# Patient Record
Sex: MALE | Race: WHITE
[De-identification: names, ages, dates, MRNs, and addresses within clinical notes are randomized per-mention and may not be internally consistent; named-entity substitution may affect disease eponyms.]

---

## 2019-11-30 ENCOUNTER — HOSPITAL ENCOUNTER (EMERGENCY)
Dept: HOSPITAL 97 - ER | Age: 49
LOS: 1 days | Discharge: HOME | End: 2019-12-01
Payer: COMMERCIAL
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
## 2019-11-30 DIAGNOSIS — S01.81XD: Primary | ICD-10-CM

## 2019-11-30 DIAGNOSIS — S61.411A: ICD-10-CM
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
## 2019-11-30 DIAGNOSIS — M25.512: ICD-10-CM

## 2019-11-30 DIAGNOSIS — S46.012A: ICD-10-CM

## 2019-11-30 DIAGNOSIS — J01.90: ICD-10-CM

## 2019-11-30 LAB
ALBUMIN SERPL BCP-MCNC: 4.2 G/DL (ref 3.4–5)
ALP SERPL-CCNC: 61 U/L (ref 45–117)
ALT SERPL W P-5'-P-CCNC: 51 U/L (ref 12–78)
AST SERPL W P-5'-P-CCNC: 35 U/L (ref 15–37)
BUN BLD-MCNC: 14 MG/DL (ref 7–18)
GLUCOSE SERPLBLD-MCNC: 107 MG/DL (ref 74–106)
HCT VFR BLD CALC: 43.9 % (ref 39.6–49)
LIPASE SERPL-CCNC: 114 U/L (ref 73–393)
LYMPHOCYTES # SPEC AUTO: 1.5 K/UL (ref 0.7–4.9)
PMV BLD: 9.1 FL (ref 7.6–11.3)
POTASSIUM SERPL-SCNC: 3.6 MMOL/L (ref 3.5–5.1)
RBC # BLD: 4.86 M/UL (ref 4.33–5.43)

## 2019-11-30 PROCEDURE — 86850 RBC ANTIBODY SCREEN: CPT

## 2019-11-30 PROCEDURE — 72125 CT NECK SPINE W/O DYE: CPT

## 2019-11-30 PROCEDURE — 73030 X-RAY EXAM OF SHOULDER: CPT

## 2019-11-30 PROCEDURE — 36415 COLL VENOUS BLD VENIPUNCTURE: CPT

## 2019-11-30 PROCEDURE — 86901 BLOOD TYPING SEROLOGIC RH(D): CPT

## 2019-11-30 PROCEDURE — 81003 URINALYSIS AUTO W/O SCOPE: CPT

## 2019-11-30 PROCEDURE — 96361 HYDRATE IV INFUSION ADD-ON: CPT

## 2019-11-30 PROCEDURE — 12011 RPR F/E/E/N/L/M 2.5 CM/<: CPT

## 2019-11-30 PROCEDURE — 71260 CT THORAX DX C+: CPT

## 2019-11-30 PROCEDURE — 96365 THER/PROPH/DIAG IV INF INIT: CPT

## 2019-11-30 PROCEDURE — 83690 ASSAY OF LIPASE: CPT

## 2019-11-30 PROCEDURE — 85025 COMPLETE CBC W/AUTO DIFF WBC: CPT

## 2019-11-30 PROCEDURE — 73130 X-RAY EXAM OF HAND: CPT

## 2019-11-30 PROCEDURE — 80048 BASIC METABOLIC PNL TOTAL CA: CPT

## 2019-11-30 PROCEDURE — 0JQ10ZZ REPAIR FACE SUBCUTANEOUS TISSUE AND FASCIA, OPEN APPROACH: ICD-10-PCS

## 2019-11-30 PROCEDURE — 86900 BLOOD TYPING SEROLOGIC ABO: CPT

## 2019-11-30 PROCEDURE — 99285 EMERGENCY DEPT VISIT HI MDM: CPT

## 2019-11-30 PROCEDURE — 80076 HEPATIC FUNCTION PANEL: CPT

## 2019-11-30 PROCEDURE — 70450 CT HEAD/BRAIN W/O DYE: CPT

## 2019-11-30 PROCEDURE — 74177 CT ABD & PELVIS W/CONTRAST: CPT

## 2019-11-30 PROCEDURE — 73590 X-RAY EXAM OF LOWER LEG: CPT

## 2019-12-01 VITALS — DIASTOLIC BLOOD PRESSURE: 76 MMHG | OXYGEN SATURATION: 98 % | SYSTOLIC BLOOD PRESSURE: 130 MMHG

## 2019-12-01 VITALS — TEMPERATURE: 98.6 F

## 2019-12-01 NOTE — RAD REPORT
EXAM DESCRIPTION:  RAD - Tib Fib Right - 12/1/2019 12:50 am

 

CLINICAL HISTORY:  PAIN

 

COMPARISON:  No comparisons

 

FINDINGS:  No fracture or dislocation seen.
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
## 2019-12-01 NOTE — RAD REPORT
EXAM DESCRIPTION:  RAD - Hand Right 3 View - 12/1/2019 12:50 am

 

CLINICAL HISTORY:  PAIN

 

COMPARISON:  No comparisons

 

FINDINGS:  No fracture or dislocation seen.

## 2019-12-01 NOTE — EDPHYS
Physician Documentation                                                                           

 [facility]                                                                 

Name: Varghese Red                                                                            

Age: 49 yrs                                                                                       

Sex: Male                                                                                         

: 1970                                                                                   

MRN: [id number]                                                                                   

Arrival Date: 2019                                                                          

Time: 22:37                                                                                       

Account#: [id number]                                                                            

Bed 4                                                                                             

Private MD:                                                                                       

ED Physician Prince Carolina                                                                      

HPI:                                                                                              

                                                                                             

22:52 This 49 yrs old  Male presents to ER via Unassigned with complaints of Boat    elroy 

      Accident.                                                                                   

22:52 The patient or guardian reports a laceration, 2.0 cm(s), clean, irregular, pain.        [facility] 

                                                                                                  

Historical:                                                                                       

- Allergies:                                                                                      

22:55 No Known Allergies;                                                                     jd3 

- Home Meds:                                                                                      

22:55 None [Active];                                                                          jd3 

- PMHx:                                                                                           

22:55 None;                                                                                   jd3 

- PSHx:                                                                                           

22:55 face; left hand; left knee;                                                             jd3 

                                                                                                  

- Immunization history:: Adult Immunizations up to date, Last tetanus immunization: up            

  to date < 5 years ago.                                                                          

- Social history:: Smoking status: Patient/guardian denies using tobacco.                         

- Immunization history: Last tetanus immunization: - up to date. < 5 years ago.                   

- Family history:: not pertinent.                                                                 

- Ebola Screening: : Patient negative for fever greater than or equal to 101.5 degrees            

  Fahrenheit, and additional compatible Ebola Virus Disease symptoms.                             

                                                                                                  

                                                                                                  

ROS:                                                                                              

22:52 Constitutional: Negative for fever, chills, and weight loss, Eyes: Negative for injury, elroy 

      pain, redness, and discharge, ENT: Negative for injury, pain, and discharge, Neck:          

      Negative for injury, pain, and swelling, Cardiovascular: Negative for chest pain,           

      palpitations, and edema, Respiratory: Negative for shortness of breath, cough,              

      wheezing, and pleuritic chest pain, Abdomen/GI: Negative for abdominal pain, nausea,        

      vomiting, diarrhea, and constipation, : Negative for injury, bleeding, discharge, and     

      swelling, Neuro: Negative for headache, weakness, numbness, tingling, and seizure,          

      Psych: Negative for depression, anxiety, suicide ideation, homicidal ideation, and          

      hallucinations, Allergy/Immunology: Negative for hives, rash, and allergies, Endocrine:     

      Negative for neck swelling, polydipsia, polyuria, polyphagia, and marked weight             

      changes, Hematologic/Lymphatic: Negative for swollen nodes, abnormal bleeding, and          

      unusual bruising.                                                                           

22:52 Back: Positive for decreased range of motion, pain at rest, pain with movement.             

22:52 MS/extremity: Positive for decreased range of motion, pain, swelling, tenderness, of        

      the right hand.                                                                             

                                                                                                  

Exam:                                                                                             

22:52 Constitutional:  This is a well developed, well nourished patient who is awake, alert,  elroy 

      and in no acute distress. Eyes:  Pupils equal round and reactive to light, extra-ocular     

      motions intact.  Lids and lashes normal.  Conjunctiva and sclera are non-icteric and        

      not injected.  Cornea within normal limits.  Periorbital areas with no swelling,            

      redness, or edema. ENT:  Nares patent. No nasal discharge, no septal abnormalities          

      noted.  Tympanic membranes are normal and external auditory canals are clear.               

      Oropharynx with no redness, swelling, or masses, exudates, or evidence of obstruction,      

      uvula midline.  Mucous membranes moist. Neck:  Trachea midline, no thyromegaly or           

      masses palpated, and no cervical lymphadenopathy.  Supple, full range of motion without     

      nuchal rigidity, or vertebral point tenderness.  No Meningismus. Chest/axilla:  Normal      

      chest wall appearance and motion.  Nontender with no deformity.  No lesions are             

      appreciated. Cardiovascular:  Regular rate and rhythm with a normal S1 and S2.  No          

      gallops, murmurs, or rubs.  Normal PMI, no JVD.  No pulse deficits. Respiratory:  Lungs     

      have equal breath sounds bilaterally, clear to auscultation and percussion.  No rales,      

      rhonchi or wheezes noted.  No increased work of breathing, no retractions or nasal          

      flaring. Abdomen/GI:  Soft, non-tender, with normal bowel sounds.  No distension or         

      tympany.  No guarding or rebound.  No evidence of tenderness throughout. Back:  No          

      spinal tenderness.  No costovertebral tenderness.  Full range of motion. Male :           

      Normal genitalia with no discharge or lesions. Neuro:  Awake and alert, GCS 15,             

      oriented to person, place, time, and situation.  Cranial nerves II-XII grossly intact.      

      Motor strength 5/5 in all extremities.  Sensory grossly intact.  Cerebellar exam            

      normal.  Normal gait. Psych:  Awake, alert, with orientation to person, place and time.     

       Behavior, mood, and affect are within normal limits.                                       

22:52 Head/face: Noted is a laceration(s), that is deep, 2 cm(s).                                 

22:52 Back: pain, that is mild, that is moderate, ROM is painful, normal spinal alignment         

      noted, CVA tenderness, that is mild, vertebral tenderness, is not appreciated, muscle       

      spasm, is appreciated in the left low back, left mid back, right mid back and right low     

      back.                                                                                       

                                                                                                  

Vital Signs:                                                                                      

22:55  / 91; Pulse 103; Resp 17 S; Temp 98.6(O); Pulse Ox 100% on R/A; Weight 95.25 kg  jd3 

      (R); Height 5 ft. 11 in. (180.34 cm) (R); Pain 4/10;                                        

                                                                                             

00:03  / 84; Pulse 100; Resp 18; Pulse Ox 100% on R/A;                                  lp1 

00:57  / 76; Pulse 96; Resp 18; Pulse Ox 98% on R/A;                                    lp1 

                                                                                             

22:55 Body Mass Index 29.29 (95.25 kg, 180.34 cm)                                             jd3 

                                                                                                  

[place] Coma Score:                                                                               

                                                                                             

23:03 Eye Response: spontaneous(4). Verbal Response: oriented(5). Motor Response: obeys       lp1 

      commands(6). Total: 15.                                                                     

                                                                                                  

Trauma Score (Adult):                                                                             

23:03 Eye Response: spontaneous(1); Verbal Response: oriented(1); Motor Response: obeys       lp1 

      commands(2); Systolic BP: > 89 mm Hg(4); Respiratory Rate: 10 to 29 per min(4); [place]     

      Score: 15; Trauma Score: 12                                                                 

                                                                                             

00:03 Eye Response: spontaneous(1); Verbal Response: oriented(1); Motor Response: obeys       lp1 

      commands(2); Systolic BP: > 89 mm Hg(4); Respiratory Rate: 10 to 29 per min(4); [place]     

      Score: 15; Trauma Score: 12                                                                 

                                                                                                  

Laceration:                                                                                       

00:25 Wound Repair of 2cm ( 0.8in ) subcutaneous laceration to left eye and left temple.      elroy 

      Irregularly shaped.. Skin/tissue flap noted.. Distal neuro/vascular/tendon intact.          

      Anesthesia: Local anesthetic administered with 3 mls of 1% lidocaine w/ Epi. Wound          

      prep: Simple cleansing with betadine by me, Copious irrigation. Skin closed with 5 6-0      

      Prolene using interrupted sutures and sterile technique. Dressed with pressure              

      dressing, non-adherent dressing. Patient tolerated well.                                    

                                                                                                  

MDM:                                                                                              

                                                                                             

22:44 Patient medically screened.                                                             [facility] 

22:52 Data reviewed: vital signs, nurses notes, lab test result(s), EKG, radiologic studies,  [facility] 

      CT scan, plain films.                                                                       

                                                                                                  

                                                                                             

22:52 Order name: Basic Metabolic Panel; Complete Time: 00:25                                 [facility] 

                                                                                             

22:52 Order name: CBC with Diff; Complete Time: 00:25                                         [facility] 

                                                                                             

22:52 Order name: Creatinine for Radiology; Complete Time: 00:25                              [facility] 

                                                                                             

22:52 Order name: Type And Screen; Complete Time: 00:25                                       [facility] 

                                                                                             

22:52 Order name: LFT's; Complete Time: 00:25                                                 [facility] 

                                                                                             

22:52 Order name: Lipase; Complete Time: 00:25                                                [facility] 

                                                                                             

22:52 Order name: CT Traumagram (Head C Spine CAP W Con)                                      [facility] 

                                                                                             

22:52 Order name: Shoulder Left (2 View) XRAY                                                 [facility] 

                                                                                             

22:52 Order name: Hand Right 3 View XRAY                                                      [facility] 

                                                                                             

22:52 Order name: Tib Fib Right XRAY                                                          [facility] 

                                                                                             

23:06 Order name: Urine Dipstick--Ancillary (enter results); Complete Time: 00:25             cm6 

                                                                                             

00:48 Order name: ABO/RH no charge                                                            [facility]

                                                                                             

22:52 Order name: Labs collected and sent; Complete Time: 23:24                               [facility] 

                                                                                             

22:52 Order name: Urine Dipstick-Ancillary (obtain specimen); Complete Time: 23:05            [facility] 

                                                                                                  

Administered Medications:                                                                         

                                                                                             

00:01 Drug: NS 0.9% 1000 ml Route: IV; Rate: 1 bolus; Site: right antecubital;                lp1 

01:28 Follow up: IV Status: Completed infusion; IV Intake: 1000ml                             lp1 

00:02 Drug: Ancef 1 grams Route: IVPB; Site: right antecubital;                               lp1 

00:57 Follow up: IV Status: Completed infusion; IV Intake: 10ml                               lp1 

01:28 Drug: Augmentin 875 mg Route: PO;                                                       lp1 

01:28 Follow up: Response: Medication administered at discharge.                              lp1 

                                                                                                  

                                                                                                  

Disposition:                                                                                      

19 01:12 Discharged to Home. Impression: Laceration without foreign body of other part      

  of head, Laceration without foreign body of right hand, Pain in left                            

  shoulder, Strain of muscle(s) and tendon(s) of the rotator cuff of left                         

  shoulder, Acute sinusitis.                                                                      

- Condition is Stable.                                                                            

- Discharge Instructions: Joint Pain, Laceration Care, Adult, Facial Laceration,                  

  Musculoskeletal Pain, Shoulder Pain, Sinusitis, Adult, Sinusitis, Adult,                        

  Easy-to-Read, Shoulder Pain, Easy-to-Read, Laceration Care, Adult, Easy-to-Read,                

  Facial Laceration, Easy-to-Read, Joint Pain, Easy-to-Read.                                      

- Prescriptions for Tylenol- Codeine #3 300-30 mg Oral Tablet - take 2 tablets by ORAL            

  route every 6 hours As needed; 26 tablet. Valium 5 mg Oral Tablet - take 1 tablet by            

  ORAL route every 8 hours As needed; 9 tablet. Augmentin 875- 125 mg Oral Tablet -               

  take 1 tablet by ORAL route every 12 hours for 10 days; 20 tablet.                              

- Medication Reconciliation Form, Thank You Letter, Antibiotic Education, Prescription            

  Opioid Use form.                                                                                

- Follow up: Private Physician; When: 2 - 3 days; Reason: Recheck today's complaints,             

  Continuance of care, Re-evaluation by your physician.                                           

- Problem is new.                                                                                 

- Symptoms have improved.                                                                         

                                                                                                  

                                                                                                  

                                                                                                  

Signatures:                                                                                       

Dispatcher [employer]                           EDMS                                                 

Prince Carolina MD MD cha Pena, Laura RN                         RN   lp1                                                  

Fred Jordan RN                    RN   jd3                                                  

                                                                                                  

Corrections: (The following items were deleted from the chart)                                    

01:29 01:12 2019 01:12 Discharged to Home. Impression: Laceration without foreign body  lp1 

      of other part of head; Laceration without foreign body of right hand; Pain in left          

      shoulder; Strain of muscle(s) and tendon(s) of the rotator cuff of left shoulder; Acute     

      sinusitis. Condition is Stable. Discharge Instructions: Joint Pain, Laceration Care,        

      Adult, Facial Laceration, Musculoskeletal Pain, Shoulder Pain, Shoulder Pain,               

      Easy-to-Read, Laceration Care, Adult, Easy-to-Read, Facial Laceration, Easy-to-Read,        

      Joint Pain, Easy-to-Read, Sinusitis, Adult, Sinusitis, Adult, Easy-to-Read.                 

      Prescriptions for Valium 5 mg Oral Tablet - take 1 tablet by ORAL route every 8 hours       

      As needed; 9 tablet, Tylenol-Codeine #3 300-30 mg Oral Tablet - take 2 tablets by ORAL      

      route every 6 hours As needed; 26 tablet, Augmentin 875-125 mg Oral Tablet - take 1         

      tablet by ORAL route every 12 hours for 10 days; 20 tablet. and Forms are Medication        

      Reconciliation Form, Thank You Letter, Antibiotic Education, Prescription Opioid Use.       

      Follow up: Private Physician; When: 2 - 3 days; Reason: Recheck today's complaints,         

      Continuance of care, Re-evaluation by your physician. Problem is new. Symptoms have         

      improved. elroy                                                                               

                                                                                                  

**************************************************************************************************

## 2019-12-01 NOTE — RAD REPORT
EXAM DESCRIPTION:  RAD - Shoulder  Left 2 View - 12/1/2019 12:50 am

 

CLINICAL HISTORY:  PAIN

 

COMPARISON:  No comparisons

 

FINDINGS:  No fracture or dislocation seen.

## 2019-12-03 NOTE — RAD REPORT
EXAM DESCRIPTION:  CT Head C-Spine chest, abdomen, pelvis W Con

 

CLINICAL HISTORY:  49 years   Male   PAIN

 

TECHNIQUE:  Contiguous axial CT images obtained through the brain and cervical spine without IV contr
ast.   Coronal and sagittal reformatted images also provided.

Axial images acquired through the chest, abdomen, and pelvis after the administration of intravenous 
contrast. Coronal and sagittal reformatted images also provided.

This CT exam was performed according to our departmental dose-optimization program, which includes on
e or more of the following dose reduction techniques: automated exposure control, adjustment of the m
A and/or kV according to patient size, and/or use of iterative reconstruction technique.

 

COMPARISON:  No prior exams provided for comparison.

 

FINDINGS:  There is no acute skull fracture, intracranial hemorrhage, extraaxial collection, or acute
 transcortical infarction. The ventricles are normal in size and contour without mass effect or midli
ne shift.

Severe left frontal, maxillary, and anterior ethmoid sinusitis. The remainder of the paranasal sinuse
s and mastoid air cells are clear.

There is no acute cervical fracture or spondylolisthesis.

Vertebral body and disc space heights are preserved without aggressive osseous lesion. There is mild 
multilevel uncovertebral arthrosis resulting in multilevel left neural foraminal narrowing. No centra
l canal stenosis in the cervical spine. No prevertebral or paraspinal soft tissue swelling.

There are is no acute fracture in the chest or thoracic spine.   There is no mediastinal hematoma, pe
ricardial effusion, pleural effusion, or pneumothorax.   The heart is normal in size and there is no 
thoracic aortic aneurysm or dissection.   Calcified granulomata. The lungs are otherwise clear withou
t focal consolidation.   No enlarged mediastinal lymph nodes. The central airways are patent.

There are no acute lumbar or pelvic fractures.   The liver, biliary tree, gallbladder, pancreas, sple
en, adrenal glands, kidneys, and urinary bladder demonstrate no acute findings.   There is no retrope
ritoneal hemorrhage, ascites, or free intraperitoneal air.   The abdominal aorta and its branches are
 normal.   There is no bowel obstruction or wall thickening.

 

IMPRESSION:  No acute injury in the head, cervical spine, chest, abdomen, or pelvis.

Severe left paranasal sinusitis.

 

Electronically signed by:   Alexandrea Ashley MD   12/1/2019 12:16 AM CST Workstation: [id number]

 

 

 

Due to temporary technical issues with the PACS/Fluency reporting system, reports are being signed by
 the in house radiologist as a courtesy to ensure prompt reporting. The interpreting radiologist is f
ully responsible for the content of the report.